# Patient Record
Sex: FEMALE
[De-identification: names, ages, dates, MRNs, and addresses within clinical notes are randomized per-mention and may not be internally consistent; named-entity substitution may affect disease eponyms.]

---

## 2020-04-30 ENCOUNTER — HOSPITAL ENCOUNTER (OUTPATIENT)
Dept: HOSPITAL 92 - CT | Age: 40
Discharge: HOME | End: 2020-04-30
Attending: INTERNAL MEDICINE
Payer: COMMERCIAL

## 2020-04-30 DIAGNOSIS — Z93.3: ICD-10-CM

## 2020-04-30 DIAGNOSIS — R10.9: ICD-10-CM

## 2020-04-30 DIAGNOSIS — K51.90: Primary | ICD-10-CM

## 2020-04-30 DIAGNOSIS — R19.5: ICD-10-CM

## 2020-04-30 PROCEDURE — 74178 CT ABD&PLV WO CNTR FLWD CNTR: CPT

## 2020-04-30 NOTE — CT
EXAM:

CT abdomen and pelvis with and without IV contrast following enterography protocol



PROVIDED CLINICAL HISTORY:

Abdominal pain and bloating for 2 to 3 weeks. History of ulcerative colitis and multiple colonic surg
eries.



COMPARISON:

None



FINDINGS:

Lung bases are clear.



A subcentimeter too small to characterize hypodense lesion is seen in the midportion left kidney.



The liver, spleen, pancreas, bilateral adrenal glands, and right kidney and abdominal aorta demonstra
te a normal CT appearance.



Urinary bladder is partially distended and normal in appearance.



Postoperative changes are seen involving a loop of small bowel in the right upper quadrant and right 
mid abdomen with mild dilatation of the loop of small bowel in the region of postsurgical change.

The remaining loops of small bowel are normal in caliber. No bowel wall thickening is seen. Postopera
tive changes related to colectomy are noted with a right lower quadrant ostomy in place. Multiple

loops of small bowel are seen extending into the pelvis and adjacent to the uterus and adnexal struct
ures which limits evaluation of the adnexal structures and uterus. However, again, there is no

bowel wall thickening in this region or dilated loops of small bowel. Hypodense cystic appearing stru
ctures are seen in what is thought to be related to muscle adjacent ovaries just superior to the

uterus likely related to dominant follicles.



No free fluid, fluid collection, or lymphadenopathy is seen in the abdomen or pelvis. Nonspecific mil
dly prominent lymph node is seen anterior to the right iliac vessels measuring 8 mm which is

overall nonspecific.



Minimal areas of sclerosis are seen involving the humeral heads bilaterally which could be related to
 developing osteonecrosis. Follow-up evaluation is recommended.



IMPRESSION:



1. Postoperative changes of the abdomen related to colectomy and right lower quadrant ostomy with pos
t surgical changes involving a loop of small bowel right upper quadrant and in the right mid

abdomen which is mildly dilated, but the remaining loops of small bowel are normal in caliber without
 bowel wall thickening seen.

2. Adnexal structures as well as uterus and multiple loops of small bowel are closely adjacent to one
 another within the pelvis limiting adequate evaluation, but the visualized fluid-filled loops of

bowel in this region are normal in caliber without bowel wall thickening. Hypodense lesions are seen 
in this region likely related to adnexal cysts/dominant follicles.

3. Ill-defined minimal sclerotic densities involving each humeral head which could be related to deve
loping osteonecrosis. Follow-up evaluation is recommended.

4. No acute findings are seen in the abdomen or pelvis.



Reported By: Junior Mosquera 

Electronically Signed:  4/30/2020 12:25 PM